# Patient Record
Sex: FEMALE | Race: WHITE | Employment: OTHER | URBAN - METROPOLITAN AREA
[De-identification: names, ages, dates, MRNs, and addresses within clinical notes are randomized per-mention and may not be internally consistent; named-entity substitution may affect disease eponyms.]

---

## 2018-04-03 ENCOUNTER — OFFICE VISIT (OUTPATIENT)
Dept: INTERNAL MEDICINE | Facility: CLINIC | Age: 73
End: 2018-04-03
Payer: COMMERCIAL

## 2018-04-03 VITALS
DIASTOLIC BLOOD PRESSURE: 60 MMHG | RESPIRATION RATE: 24 BRPM | SYSTOLIC BLOOD PRESSURE: 100 MMHG | TEMPERATURE: 97.6 F | BODY MASS INDEX: 13.59 KG/M2 | HEIGHT: 64 IN | OXYGEN SATURATION: 95 % | WEIGHT: 79.6 LBS | HEART RATE: 88 BPM

## 2018-04-03 DIAGNOSIS — Z78.0 ASYMPTOMATIC MENOPAUSE: ICD-10-CM

## 2018-04-03 DIAGNOSIS — Z12.11 SPECIAL SCREENING FOR MALIGNANT NEOPLASMS, COLON: ICD-10-CM

## 2018-04-03 DIAGNOSIS — Z13.1 SCREENING FOR DIABETES MELLITUS: ICD-10-CM

## 2018-04-03 DIAGNOSIS — Z12.2 ENCOUNTER FOR SCREENING FOR LUNG CANCER: ICD-10-CM

## 2018-04-03 DIAGNOSIS — Z13.220 SCREENING FOR CHOLESTEROL LEVEL: ICD-10-CM

## 2018-04-03 DIAGNOSIS — J44.9 CHRONIC OBSTRUCTIVE PULMONARY DISEASE, UNSPECIFIED COPD TYPE (H): ICD-10-CM

## 2018-04-03 DIAGNOSIS — Z11.59 NEED FOR HEPATITIS C SCREENING TEST: ICD-10-CM

## 2018-04-03 DIAGNOSIS — Z23 NEED FOR VACCINATION: ICD-10-CM

## 2018-04-03 DIAGNOSIS — Z76.89 ENCOUNTER TO ESTABLISH CARE: Primary | ICD-10-CM

## 2018-04-03 DIAGNOSIS — Z12.31 ENCOUNTER FOR SCREENING MAMMOGRAM FOR BREAST CANCER: ICD-10-CM

## 2018-04-03 DIAGNOSIS — R21 RASH: ICD-10-CM

## 2018-04-03 DIAGNOSIS — Z71.6 ENCOUNTER FOR SMOKING CESSATION COUNSELING: ICD-10-CM

## 2018-04-03 DIAGNOSIS — R63.6 UNDERWEIGHT: ICD-10-CM

## 2018-04-03 PROCEDURE — 99204 OFFICE O/P NEW MOD 45 MIN: CPT | Mod: 25 | Performed by: INTERNAL MEDICINE

## 2018-04-03 PROCEDURE — 90471 IMMUNIZATION ADMIN: CPT | Performed by: INTERNAL MEDICINE

## 2018-04-03 PROCEDURE — 90736 HZV VACCINE LIVE SUBQ: CPT | Performed by: INTERNAL MEDICINE

## 2018-04-03 RX ORDER — CLOBETASOL PROPIONATE 0.5 MG/ML
SOLUTION TOPICAL
Qty: 50 ML | Refills: 0 | Status: SHIPPED | OUTPATIENT
Start: 2018-04-03 | End: 2018-09-26

## 2018-04-03 RX ORDER — ALBUTEROL SULFATE 90 UG/1
2 AEROSOL, METERED RESPIRATORY (INHALATION) EVERY 6 HOURS PRN
Qty: 1 INHALER | Refills: 1 | Status: SHIPPED | OUTPATIENT
Start: 2018-04-03 | End: 2018-10-07

## 2018-04-03 RX ORDER — POLYETHYLENE GLYCOL 3350 17 G
2 POWDER IN PACKET (EA) ORAL
Qty: 360 LOZENGE | Refills: 3 | Status: SHIPPED | OUTPATIENT
Start: 2018-04-03 | End: 2018-09-26

## 2018-04-03 ASSESSMENT — PAIN SCALES - GENERAL: PAINLEVEL: NO PAIN (0)

## 2018-04-03 NOTE — PATIENT INSTRUCTIONS
Shingrix vaccine #1 today. #2 in 2-6 months.    ---    Please schedule CT chest, mammogram, bone scan, and fasting labs on the way out today.    ---    Please  stool kit in the lab downstairs.    ---    For scalp rash (suspect psoriasis) recommend clobetasol solution twice a day until resolution (though it may reoccur)    ---    Refill of albuterol sent to pharmacy.    ---    Please call to schedule a nutrition evaluation when able - number below.     ---    Nicotine patches daily + nicotine lozenges as needed.

## 2018-04-03 NOTE — MR AVS SNAPSHOT
After Visit Summary   4/3/2018    Zainab Colunga    MRN: [id number]           Patient Information     Date Of Birth          1945        Visit Information        Provider Department      4/3/2018 2:00 PM Yu Rao MD [facility]        Today's Diagnoses     Need for vaccination    -  1    H/O vulvar dysplasia        Underweight        Personal history of tobacco use, presenting hazards to health        Special screening for malignant neoplasms, colon        Asymptomatic menopause        Encounter for screening mammogram for breast cancer        Chronic obstructive pulmonary disease, unspecified COPD type (H)        Need for hepatitis C screening test        Rash        Screening for cholesterol level        Screening for diabetes mellitus        Encounter for smoking cessation counseling          Care Instructions    Shingrix vaccine #1 today. #2 in 2-6 months.    ---    Please schedule CT chest, mammogram, bone scan, and fasting labs on the way out today.    ---    Please  stool kit in the lab downstairs.    ---    For scalp rash (suspect psoriasis) recommend clobetasol solution twice a day until resolution (though it may reoccur)    ---    Refill of albuterol sent to pharmacy.    ---    Please call to schedule a nutrition evaluation when able - number below.     ---    Nicotine patches daily + nicotine lozenges as needed.             Follow-ups after your visit        Additional Services     NUTRITION REFERRAL       Your provider has referred you to: [facility]: [facility] [phone]   [email or web address]    Please be aware that coverage of these services is subject to the terms and limitations of your health insurance plan.  Call member services at your health plan with any benefit or coverage questions.      Please bring the following with you to your appointment:    (1) This referral  "request  (2) Any documents given to you regarding this referral  (3) Any specific questions you have about diet and/or food choices                  Future tests that were ordered for you today     Open Future Orders        Priority Expected Expires Ordered    Lipid Profile Routine  4/3/2019 4/3/2018    CBC with platelets Routine  4/3/2019 4/3/2018    Comprehensive metabolic panel Routine  4/3/2019 4/3/2018    Hepatitis C Screen Reflex to HCV RNA Quant and Genotype Routine  4/3/2019 4/3/2018    TSH with free T4 reflex Routine  4/3/2019 4/3/2018    DX Hip/Pelvis/Spine Routine  4/4/2019 4/3/2018    Fecal colorectal cancer screen (FIT) Routine 4/24/2018 6/26/2018 4/3/2018    CT Chest Lung Cancer Scrn Low Dose wo Routine  4/3/2019 4/3/2018    MA Screening Digital Bilateral Routine  4/4/2019 4/3/2018            Who to contact     If you have questions or need follow up information about today's clinic visit or your schedule please contact [facility] directly at [phone].  Normal or non-critical lab and imaging results will be communicated to you by BlueRoninhart, letter or phone within 4 business days after the clinic has received the results. If you do not hear from us within 7 days, please contact the clinic through CargoSpottert or phone. If you have a critical or abnormal lab result, we will notify you by phone as soon as possible.  Submit refill requests through Allihub or call your pharmacy and they will forward the refill request to us. Please allow 3 business days for your refill to be completed.          Additional Information About Your Visit        Allihub Information     Allihub lets you send messages to your doctor, view your test results, renew your prescriptions, schedule appointments and more. To sign up, go to [email or web address] . Click on \"Log in\" on the left side of the screen, which will take you to the Welcome page. Then click on \"Sign up Now\" on the right side of the page. " "    You will be asked to enter the access code listed below, as well as some personal information. Please follow the directions to create your username and password.     Your access code is: [email or web address]  Expires: 2018  2:54 PM     Your access code will  in 90 days. If you need help or a new code, please call your [place] clinic or [phone].        Care EveryWhere ID     This is your Care EveryWhere ID. This could be used by other organizations to access your [place] medical records  [id number]        Your Vitals Were     Pulse Temperature Respirations Height Last Period Pulse Oximetry    88 97.6  F (36.4  C) (Oral) 24 5' 3.8\" (1.621 m) (Approximate) 95%    Breastfeeding? BMI (Body Mass Index)                No 13.75 kg/m2           Blood Pressure from Last 3 Encounters:   18 100/60    Weight from Last 3 Encounters:   18 79 lb 9.6 oz (36.1 kg)              We Performed the Following     1st  Administration  [[id number]]     NUTRITION REFERRAL     SHINGRIX [[id number]]          Today's Medication Changes          These changes are accurate as of 4/3/18  2:58 PM.  If you have any questions, ask your nurse or doctor.               Start taking these medicines.        Dose/Directions    albuterol 108 (90 BASE) MCG/ACT Inhaler   Commonly known as:  PROAIR HFA/PROVENTIL HFA/VENTOLIN HFA   Used for:  Chronic obstructive pulmonary disease, unspecified COPD type (H)   Started by:  Yu Rao MD        Dose:  2 puff   Inhale 2 puffs into the lungs every 6 hours as needed for shortness of breath / dyspnea or wheezing   Quantity:  1 Inhaler   Refills:  1       clobetasol 0.05 % external solution   Commonly known as:  TEMOVATE   Used for:  Rash   Started by:  Yu Rao MD        Apply sparingly to affected area twice daily for 14 days.  Do not apply to face.   Quantity:  50 mL   Refills:  0       nicotine 7 MG/24HR 24 hr patch   Commonly known as:  NICODERM CQ   Used for:  Encounter for " smoking cessation counseling   Started by:  Yu Rao MD        Dose:  1 patch   Place 1 patch onto the skin every 24 hours   Quantity:  30 patch   Refills:  3       nicotine polacrilex 2 MG lozenge   Commonly known as:  COMMIT   Used for:  Encounter for smoking cessation counseling   Started by:  Yu Rao MD        Dose:  2 mg   Place 1 lozenge (2 mg) inside cheek every hour as needed for smoking cessation   Quantity:  360 lozenge   Refills:  3            Where to get your medicines      These medications were sent to [facility] [address] [place] [address] [id number] [place] [address]  [address] [facility] [address]     Phone:  [phone]     albuterol 108 (90 BASE) MCG/ACT Inhaler    clobetasol 0.05 % external solution    nicotine 7 MG/24HR 24 hr patch    nicotine polacrilex 2 MG lozenge                Primary Care Provider    None Specified       No primary provider on file.        Equal Access to Services     [facility]: Hadii saeid montenegroo Soclaire, waaxda luqadaha, qaybta kaalmada adepaulyada, rudolph zimmer . So [facility] [phone].    ATENCIÓN: Si habla español, tiene a amezquita disposición servicios gratuitos de asistencia lingüística. [facility] [phone].    We comply with applicable federal civil rights laws and [place] laws. We do not discriminate on the basis of race, color, national origin, age, disability, sex, sexual orientation, or gender identity.            Thank you!     Thank you for choosing [facility]  for your care. Our goal is always to provide you with excellent care. Hearing back from our patients is one way we can continue to improve our services. Please take a few minutes to complete the written survey that you may receive in the mail after your visit with us. Thank you!             Your Updated Medication List - Protect others around you: Learn how to safely use, store and throw away your medicines at  www.disposemymeds.org.          This list is accurate as of 4/3/18  2:58 PM.  Always use your most recent med list.                   Brand Name Dispense Instructions for use Diagnosis    albuterol 108 (90 BASE) MCG/ACT Inhaler    PROAIR HFA/PROVENTIL HFA/VENTOLIN HFA    1 Inhaler    Inhale 2 puffs into the lungs every 6 hours as needed for shortness of breath / dyspnea or wheezing    Chronic obstructive pulmonary disease, unspecified COPD type (H)       clobetasol 0.05 % external solution    TEMOVATE    50 mL    Apply sparingly to affected area twice daily for 14 days.  Do not apply to face.    Rash       nicotine 7 MG/24HR 24 hr patch    NICODERM CQ    30 patch    Place 1 patch onto the skin every 24 hours    Encounter for smoking cessation counseling       nicotine polacrilex 2 MG lozenge    COMMIT    360 lozenge    Place 1 lozenge (2 mg) inside cheek every hour as needed for smoking cessation    Encounter for smoking cessation counseling

## 2018-04-04 NOTE — PROGRESS NOTES
"  SUBJECTIVE:                                                      HPI: Zainab Colunga is a pleasant 73 year old female who presents to [facility] care:    Accompanied by grandson.    Complains of a rash:  - ongoing chronically - off and on for years; recurred/flared up several months ago  - involves posterior scalp and upper neck  - mildly itchy  - no rash elsewhere     Would like to stop smoking:  - has been smoking for ~60 years  - at most ~1.5 ppd  - currently ~3-5 cigarettes daily  - history of adverse reaction to Wellbutrin (hyperactivity)  - history of adverse reaction to nicotine replacement in the 1960s, but open to retrying now  - declines trial of Chantix    Past Medical History:   Diagnosis Date     COPD (chronic obstructive pulmonary disease) (H)      H/O vulvar dysplasia 1996    s/p bruno-vulvectomy     Underweight      Re: COPD:    - not on controller medications due to cost   - uses albuterol as needed - needs refills   - can only walk ~150 feet before having to rest due to shortness of breath    - requests completion of disability parking application based on this    Re: history of vulvar dysplasia:   - s/p hemipelvectomy in 1996    - pap smear 2015 negative/normal; no further screening indicated    Re: underweight:   - chronic - ongoing for years   - endorses poor appetite - chronic tobacco use likely contributing   - also endorses poor nutrition in general   - she is interested in meeting with a nutritionist to discuss gaining weight   - patient is overdue for multiple cancer screenings    Past Surgical History:   Procedure Laterality Date     CATARACT IOL, RT/LT Bilateral      GYN SURGERY  1960s    fallopian tube \"repair\"     VULVECTOMY SIMPLE  1996    bruno-vulvectomy     Family History   Problem Relation Age of Onset     CANCER Mother      unknown type     Type 2 Diabetes Brother      Coronary Artery Disease Brother      s/p PCI later in life     Lung Cancer Brother      smoker     Myocardial " "Infarction No family hx of      CEREBROVASCULAR DISEASE No family hx of      Coronary Artery Disease Early Onset No family hx of      Colon Cancer No family hx of      Ovarian Cancer No family hx of      Breast Cancer No family hx of      Occupational History     Retired - factory and cleaning jobs      Social History Main Topics     Smoking status: Current Every Day Smoker     Packs/day: 1.50     Years: 60.00     Types: Cigarettes     Smokeless tobacco: Never Used      Comment: 3-5 cigs/day     Alcohol use No     Drug use: No     Sexual activity: Not Currently     Social History Narrative    .    Lives in an apartment with  ( is passing away as of April, 2018).     Grandson and daughter help with cleaning, bills, cooking, groceries, and transportation.     1 adult daughter and 1 grandson.      Allergies   Allergen Reactions     Bupropion       hyperactivity     Ibuprofen      blurred vision with higher (800mg) dose; can tolerate 200mg dose     Penicillins Hives     MEDS: None    Immunization History   Administered Date(s) Administered     Pneumo Conj 13-V (2010&after) 11/22/2016     Pneumococcal 23 valent 11/01/2010     TDAP Vaccine (Adacel) 05/01/2015     Zoster vaccine recombinant adjuvanted (SHINGRIX) 04/03/2018     OBJECTIVE:                                                      /60 (BP Location: Left arm, Patient Position: Chair, Cuff Size: Adult Regular)  Pulse 88  Temp 97.6  F (36.4  C) (Oral)  Resp 24  Ht 5' 3.8\" (1.621 m)  Wt 79 lb 9.6 oz (36.1 kg)  LMP  (Approximate)  SpO2 95%  Breastfeeding? No  BMI 13.75 kg/m2  Constitutional: frail-appearing and underweight  Psych: normal judgment and insight; normal mood and affect; recent and remote memory intact; oriented to time, place, and person  Integumentary: multiple erythematous patches, with thick overlying, silver white scale involving occipital scalp and upper neck    PREVENTATIVE HEALTH                                    "                   Blood pressure: within normal limits   Breast CA screening: DUE  Cervical CA screening: screening no longer indicated  Colon CA screening: DUE  Lung CA screening: DUE  Dexa: DUE  Screening HCV: DUE  Screening cholesterol: DUE  Screening diabetes: DUE  STD testing: no risk factors present  Depression screening: PHQ-2 assessment completed and reviewed - no intervention indicated at this time  Alcohol misuse screening: alcohol use reviewed - no intervention indicated at this time  Immunizations: reviewed; Shingrix series recommended    ASSESSMENT/PLAN:                                                       (Z76.89) Encounter to establish care  (primary encounter diagnosis)  Comment: PMH, PSH, FH, SH, medications, allergies, immunizations, and preventative health measures reviewed.   Plan: see below for plans.    (Z23) Need for vaccination  Plan: Shingrix #1 today; #2 in 2-6 months.    (Z78.0) Asymptomatic menopause  Plan: DEXA ordered - patient to schedule.    (Z12.11) Special screening for malignant neoplasms, colon  Comment: Patient declines screening colonoscopy.  Plan: FIT test ordered - patient to  kit, complete, and mail in when able.     (Z12.31) Encounter for screening mammogram for breast cancer  Plan: mammogram ordered - patient to schedule.    (Z12.2) Encounter for screening for lung cancer  Plan: CT chest lung cancer screening ordered - patient to schedule.    (Z13.220) Screening for cholesterol level  (Z13.1) Screening for diabetes mellitus  (Z11.59) Need for hepatitis C screening test  Plan: Patient will return for fasting labs when able.    (R21) Rash  Comment: involving the occipital scalp and upper neck; suspect psoriasis.  Plan: clobetasol 0.05% solution bid until resolution.       (Z71.6,  Z72.0) Encounter for smoking cessation counseling  Comment: see discussion above.  Plan:    - nicotine patch (7 mg) daily.   - nicotine lozenges to be used as needed for acute  cravings.    (R63.6) Underweight  Comment:    - etiology unclear.    - chronic tobacco use likely contributing.   - need to evaluate for other potential causes including malignancy (patient is due for multiple screenings).  Plan:    - CBC, CMP, TSH reflex with above labs.   - cancer screenings as above.   - patient encouraged to stop smoking.    - referred to nutrition for further evaluation, education, and meal planning - patient to schedule.    (J44.9) Chronic obstructive pulmonary disease, unspecified COPD type (H)  Comment: cannot walk more than 150 feet without stopping to catch her breath.  Plan:    - refill of albuterol provided.   - disability parking placard application completed today.    The instructions on the AVS were discussed and explained to the patient. Patient expressed understanding of instructions.    A total of 45 minutes were spent face-to-face with this patient during this encounter and over half of that time was spent on counseling and coordination of care re: above diagnoses and plans of care.     (Chart documentation was completed, in part, with Sof[employer]ie voice-recognition software. Even though reviewed, some grammatical, spelling, and word errors may remain.)    Yu Rao MD   [address] [address]  T: [phone], F: [phone]

## 2018-04-06 ENCOUNTER — TELEPHONE (OUTPATIENT)
Dept: INTERNAL MEDICINE | Facility: CLINIC | Age: 73
End: 2018-04-06

## 2018-04-06 NOTE — TELEPHONE ENCOUNTER
Nutrition Education Scheduling Outreach #1:    Call to patient to schedule. Patient declined to schedule.        Dana Churchill  [facility]  Diabetes and Nutrition Scheduling

## 2018-04-20 DIAGNOSIS — Z71.6 ENCOUNTER FOR TOBACCO USE CESSATION COUNSELING: Primary | ICD-10-CM

## 2018-04-20 NOTE — TELEPHONE ENCOUNTER
Pharmacy called clinic today  Nicotine patches are not covered under her insurance  Pharmacy asking to resend Rx for Nicotrol inhaler instead

## 2018-04-20 NOTE — TELEPHONE ENCOUNTER
Nicotrol Inhaler       Last Written Prescription Date:  NA  Last Fill Quantity: Na,   # refills: NA  Last Office Visit: 4/3/18  Future Office visit:       Routing refill request to provider for review/approval because:  Drug not active on patient's medication list

## 2018-04-28 DIAGNOSIS — Z12.11 SPECIAL SCREENING FOR MALIGNANT NEOPLASMS, COLON: ICD-10-CM

## 2018-04-28 PROCEDURE — 82274 ASSAY TEST FOR BLOOD FECAL: CPT | Performed by: INTERNAL MEDICINE

## 2018-04-29 LAB — HEMOCCULT STL QL IA: NEGATIVE

## 2018-05-02 ENCOUNTER — RADIANT APPOINTMENT (OUTPATIENT)
Dept: BONE DENSITY | Facility: CLINIC | Age: 73
End: 2018-05-02
Attending: INTERNAL MEDICINE
Payer: COMMERCIAL

## 2018-05-02 ENCOUNTER — RADIANT APPOINTMENT (OUTPATIENT)
Dept: MAMMOGRAPHY | Facility: CLINIC | Age: 73
End: 2018-05-02
Attending: INTERNAL MEDICINE
Payer: COMMERCIAL

## 2018-05-02 DIAGNOSIS — Z12.31 VISIT FOR SCREENING MAMMOGRAM: ICD-10-CM

## 2018-05-02 DIAGNOSIS — Z12.31 ENCOUNTER FOR SCREENING MAMMOGRAM FOR BREAST CANCER: ICD-10-CM

## 2018-05-02 DIAGNOSIS — Z78.0 ASYMPTOMATIC MENOPAUSE: ICD-10-CM

## 2018-05-02 PROCEDURE — 77085 DXA BONE DENSITY AXL VRT FX: CPT | Performed by: INTERNAL MEDICINE

## 2018-05-02 PROCEDURE — 77067 SCR MAMMO BI INCL CAD: CPT | Mod: TC

## 2018-05-03 DIAGNOSIS — R63.6 UNDERWEIGHT: ICD-10-CM

## 2018-05-03 DIAGNOSIS — Z13.220 SCREENING FOR CHOLESTEROL LEVEL: ICD-10-CM

## 2018-05-03 DIAGNOSIS — Z11.59 NEED FOR HEPATITIS C SCREENING TEST: ICD-10-CM

## 2018-05-03 LAB
ALBUMIN SERPL-MCNC: 3.8 G/DL (ref 3.4–5)
ALP SERPL-CCNC: 77 U/L (ref 40–150)
ALT SERPL W P-5'-P-CCNC: 20 U/L (ref 0–50)
ANION GAP SERPL CALCULATED.3IONS-SCNC: 7 MMOL/L (ref 3–14)
AST SERPL W P-5'-P-CCNC: 16 U/L (ref 0–45)
BILIRUB SERPL-MCNC: 0.5 MG/DL (ref 0.2–1.3)
BUN SERPL-MCNC: 18 MG/DL (ref 7–30)
CALCIUM SERPL-MCNC: 9.3 MG/DL (ref 8.5–10.1)
CHLORIDE SERPL-SCNC: 108 MMOL/L (ref 94–109)
CHOLEST SERPL-MCNC: 219 MG/DL
CO2 SERPL-SCNC: 27 MMOL/L (ref 20–32)
CREAT SERPL-MCNC: 0.72 MG/DL (ref 0.52–1.04)
ERYTHROCYTE [DISTWIDTH] IN BLOOD BY AUTOMATED COUNT: 12.6 % (ref 10–15)
GFR SERPL CREATININE-BSD FRML MDRD: 80 ML/MIN/1.7M2
GLUCOSE SERPL-MCNC: 91 MG/DL (ref 70–99)
HCT VFR BLD AUTO: 42.2 % (ref 35–47)
HCV AB SERPL QL IA: NONREACTIVE
HDLC SERPL-MCNC: 101 MG/DL
HGB BLD-MCNC: 13.8 G/DL (ref 11.7–15.7)
LDLC SERPL CALC-MCNC: 104 MG/DL
MCH RBC QN AUTO: 32.9 PG (ref 26.5–33)
MCHC RBC AUTO-ENTMCNC: 32.7 G/DL (ref 31.5–36.5)
MCV RBC AUTO: 101 FL (ref 78–100)
NONHDLC SERPL-MCNC: 118 MG/DL
PLATELET # BLD AUTO: 195 10E9/L (ref 150–450)
POTASSIUM SERPL-SCNC: 4.4 MMOL/L (ref 3.4–5.3)
PROT SERPL-MCNC: 7 G/DL (ref 6.8–8.8)
RBC # BLD AUTO: 4.2 10E12/L (ref 3.8–5.2)
SODIUM SERPL-SCNC: 142 MMOL/L (ref 133–144)
TRIGL SERPL-MCNC: 69 MG/DL
TSH SERPL DL<=0.005 MIU/L-ACNC: 2.95 MU/L (ref 0.4–4)
WBC # BLD AUTO: 7.2 10E9/L (ref 4–11)

## 2018-05-03 PROCEDURE — 80061 LIPID PANEL: CPT | Performed by: INTERNAL MEDICINE

## 2018-05-03 PROCEDURE — 84443 ASSAY THYROID STIM HORMONE: CPT | Performed by: INTERNAL MEDICINE

## 2018-05-03 PROCEDURE — 36415 COLL VENOUS BLD VENIPUNCTURE: CPT | Performed by: INTERNAL MEDICINE

## 2018-05-03 PROCEDURE — 85027 COMPLETE CBC AUTOMATED: CPT | Performed by: INTERNAL MEDICINE

## 2018-05-03 PROCEDURE — 80053 COMPREHEN METABOLIC PANEL: CPT | Performed by: INTERNAL MEDICINE

## 2018-05-03 PROCEDURE — 86803 HEPATITIS C AB TEST: CPT | Performed by: INTERNAL MEDICINE

## 2018-05-08 DIAGNOSIS — M81.0 AGE-RELATED OSTEOPOROSIS WITHOUT CURRENT PATHOLOGICAL FRACTURE: Primary | ICD-10-CM

## 2018-05-08 NOTE — TELEPHONE ENCOUNTER
Reason for call:  Medication   If this is a refill request, has the caller requested the refill from the pharmacy already? No  Will the patient be using a [facility]? No  Name of the pharmacy and phone number for the current request: [facility] mail order pharmacy    Name of the medication requested: patient needs a rx generic fosamax, vit D and calcium sent to [facility] [phone]. They cover these medications for free    Other request: please call in RX    Phone number to reach patient:  Home number on file [phone] (home)    Best Time:  anytime    Can we leave a detailed message on this number?  YES

## 2018-05-09 RX ORDER — ALENDRONATE SODIUM 70 MG/1
TABLET ORAL
Qty: 4 TABLET | Refills: 1 | Status: SHIPPED | OUTPATIENT
Start: 2018-05-09 | End: 2018-09-26

## 2018-05-14 ENCOUNTER — HOSPITAL ENCOUNTER (OUTPATIENT)
Dept: CT IMAGING | Facility: CLINIC | Age: 73
Discharge: HOME | End: 2018-05-14
Attending: INTERNAL MEDICINE | Admitting: INTERNAL MEDICINE
Payer: COMMERCIAL

## 2018-05-14 DIAGNOSIS — Z12.2 ENCOUNTER FOR SCREENING FOR LUNG CANCER: ICD-10-CM

## 2018-05-14 PROCEDURE — G0297 LDCT FOR LUNG CA SCREEN: HCPCS

## 2018-05-25 ENCOUNTER — TELEPHONE (OUTPATIENT)
Dept: INTERNAL MEDICINE | Facility: CLINIC | Age: 73
End: 2018-05-25

## 2018-09-26 ENCOUNTER — OFFICE VISIT (OUTPATIENT)
Dept: INTERNAL MEDICINE | Facility: CLINIC | Age: 73
End: 2018-09-26
Payer: COMMERCIAL

## 2018-09-26 VITALS
WEIGHT: 85.6 LBS | HEART RATE: 95 BPM | OXYGEN SATURATION: 97 % | DIASTOLIC BLOOD PRESSURE: 72 MMHG | SYSTOLIC BLOOD PRESSURE: 110 MMHG | RESPIRATION RATE: 20 BRPM | BODY MASS INDEX: 14.79 KG/M2 | TEMPERATURE: 97.9 F

## 2018-09-26 DIAGNOSIS — R63.6 UNDERWEIGHT: ICD-10-CM

## 2018-09-26 DIAGNOSIS — Z23 NEED FOR PROPHYLACTIC VACCINATION AND INOCULATION AGAINST INFLUENZA: ICD-10-CM

## 2018-09-26 DIAGNOSIS — J44.9 SEVERE CHRONIC OBSTRUCTIVE PULMONARY DISEASE (H): Primary | ICD-10-CM

## 2018-09-26 PROCEDURE — G0008 ADMIN INFLUENZA VIRUS VAC: HCPCS | Performed by: INTERNAL MEDICINE

## 2018-09-26 PROCEDURE — 99213 OFFICE O/P EST LOW 20 MIN: CPT | Mod: 25 | Performed by: INTERNAL MEDICINE

## 2018-09-26 PROCEDURE — 90662 IIV NO PRSV INCREASED AG IM: CPT | Performed by: INTERNAL MEDICINE

## 2018-09-26 NOTE — PATIENT INSTRUCTIONS
Advair 2 puffs TWICE a day - please use consistently for best results.    Follow-up in 4-6 weeks.     ---    Flu shot today.

## 2018-09-26 NOTE — PROGRESS NOTES
SUBJECTIVE:                                                      HPI: Zainab Colunga is a pleasant 73 year old female who presents for COPD follow-up:    Patient has a history of severe COPD. She has historically not been able to afford controller medications and has been using her albuterol inhaler as needed for shortness of breath. She is only able to walk 10 feet before feeling so short of breath she has to rest.    No wheezing or coughing.    She is underweight likely due to the increased metabolic demands of severe COPD.    Flu shot DUE.    The medication, allergy, and problem lists have been reviewed and updated as appropriate.       OBJECTIVE:                                                      /72  Pulse 95  Temp 97.9  F (36.6  C) (Oral)  Resp 20  Wt 85 lb 9.6 oz (38.8 kg)  SpO2 97%  BMI 14.79 kg/m2  Constitutional: frail appearing  Respiratory: normal respiratory effort; clear to auscultation bilaterally - no wheezing       ASSESSMENT/PLAN:                                                      (J44.9) Severe chronic obstructive pulmonary disease (H)  (primary encounter diagnosis)  (R63.6) Underweight  Comment: patient really needs to be on a controller medication.  Plan:    - prescription for Advair provided.   - pharmacy instructed to substitute as needed for affordability    - pharmacy instructed to seek coupons or financial assistance for patient where able.     - follow-up in 4-6 weeks.   - patient would also likely benefit from pulmonary rehab - will discuss at next visit.     (Z23) Need for prophylactic vaccination and inoculation against influenza  Plan: high-dose flu shot given today.    The instructions on the AVS were discussed and explained to the patient. Patient expressed understanding of instructions.    (Chart documentation was completed, in part, with [employer] voice-recognition software. Even though reviewed, some grammatical, spelling, and word errors may remain.)    Yu  MD Jalen   [address] [address]  T: [phone], F: [phone]

## 2018-10-06 ENCOUNTER — APPOINTMENT (OUTPATIENT)
Dept: GENERAL RADIOLOGY | Facility: CLINIC | Age: 73
End: 2018-10-06
Attending: EMERGENCY MEDICINE
Payer: COMMERCIAL

## 2018-10-06 ENCOUNTER — HOSPITAL ENCOUNTER (EMERGENCY)
Facility: CLINIC | Age: 73
Discharge: HOME | End: 2018-10-06
Attending: EMERGENCY MEDICINE | Admitting: EMERGENCY MEDICINE
Payer: COMMERCIAL

## 2018-10-06 VITALS
RESPIRATION RATE: 20 BRPM | TEMPERATURE: 97.5 F | OXYGEN SATURATION: 97 % | DIASTOLIC BLOOD PRESSURE: 73 MMHG | SYSTOLIC BLOOD PRESSURE: 145 MMHG | HEART RATE: 101 BPM

## 2018-10-06 DIAGNOSIS — J44.1 COPD EXACERBATION (H): ICD-10-CM

## 2018-10-06 DIAGNOSIS — J20.9 ACUTE BRONCHITIS, UNSPECIFIED ORGANISM: ICD-10-CM

## 2018-10-06 PROCEDURE — 94640 AIRWAY INHALATION TREATMENT: CPT

## 2018-10-06 PROCEDURE — 25000125 ZZHC RX 250: Performed by: EMERGENCY MEDICINE

## 2018-10-06 PROCEDURE — 25000132 ZZH RX MED GY IP 250 OP 250 PS 637: Performed by: EMERGENCY MEDICINE

## 2018-10-06 PROCEDURE — 99284 EMERGENCY DEPT VISIT MOD MDM: CPT | Mod: 25

## 2018-10-06 PROCEDURE — 94664 DEMO&/EVAL PT USE INHALER: CPT

## 2018-10-06 PROCEDURE — 40000275 ZZH STATISTIC RCP TIME EA 10 MIN

## 2018-10-06 PROCEDURE — 71046 X-RAY EXAM CHEST 2 VIEWS: CPT

## 2018-10-06 RX ORDER — IPRATROPIUM BROMIDE AND ALBUTEROL SULFATE 2.5; .5 MG/3ML; MG/3ML
3 SOLUTION RESPIRATORY (INHALATION) ONCE
Status: COMPLETED | OUTPATIENT
Start: 2018-10-06 | End: 2018-10-06

## 2018-10-06 RX ORDER — PREDNISONE 20 MG/1
40 TABLET ORAL DAILY
Qty: 8 TABLET | Refills: 0 | Status: SHIPPED | OUTPATIENT
Start: 2018-10-06 | End: 2018-10-10

## 2018-10-06 RX ORDER — PREDNISONE 20 MG/1
40 TABLET ORAL ONCE
Status: COMPLETED | OUTPATIENT
Start: 2018-10-06 | End: 2018-10-06

## 2018-10-06 RX ORDER — DOXYCYCLINE 100 MG/1
100 CAPSULE ORAL ONCE
Status: COMPLETED | OUTPATIENT
Start: 2018-10-06 | End: 2018-10-06

## 2018-10-06 RX ORDER — CALCIUM CARBONATE 500(1250)
1 TABLET ORAL 2 TIMES DAILY
COMMUNITY

## 2018-10-06 RX ORDER — DOXYCYCLINE 100 MG/1
100 CAPSULE ORAL 2 TIMES DAILY
Qty: 20 CAPSULE | Refills: 0 | Status: SHIPPED | OUTPATIENT
Start: 2018-10-06 | End: 2018-10-16

## 2018-10-06 RX ADMIN — IPRATROPIUM BROMIDE AND ALBUTEROL SULFATE 3 ML: 2.5; .5 SOLUTION RESPIRATORY (INHALATION) at 20:39

## 2018-10-06 RX ADMIN — PREDNISONE 40 MG: 20 TABLET ORAL at 19:49

## 2018-10-06 RX ADMIN — IPRATROPIUM BROMIDE AND ALBUTEROL SULFATE 3 ML: 2.5; .5 SOLUTION RESPIRATORY (INHALATION) at 19:51

## 2018-10-06 RX ADMIN — DOXYCYCLINE HYCLATE 100 MG: 100 CAPSULE, GELATIN COATED ORAL at 21:37

## 2018-10-06 ASSESSMENT — ENCOUNTER SYMPTOMS
WHEEZING: 1
SHORTNESS OF BREATH: 1
FEVER: 0

## 2018-10-06 NOTE — ED AVS SNAPSHOT
Emergency Department    [address] [facility] [address]    Phone:  [phone]    Fax:  [phone]                                       Zainab Colunga   MRN: [id number]    Department:   Emergency Department   Date of Visit:  10/6/2018           After Visit Summary Signature Page     I have received my discharge instructions, and my questions have been answered. I have discussed any challenges I see with this plan with the nurse or doctor.    ..........................................................................................................................................  Patient/Patient Representative Signature      ..........................................................................................................................................  Patient Representative Print Name and Relationship to Patient    ..................................................               ................................................  Date                                   Time    ..........................................................................................................................................  Reviewed by Signature/Title    ...................................................              ..............................................  Date                                               Time          22EPIC Rev 08/18

## 2018-10-06 NOTE — ED AVS SNAPSHOT
Emergency Department    [address] [facility] [address]    Phone:  [phone]    Fax:  [phone]                                       Zainab Colunga   MRN: [id number]    Department:   Emergency Department   Date of Visit:  10/6/2018           Patient Information     Date Of Birth          1945        Your diagnoses for this visit were:     COPD exacerbation (H)     Acute bronchitis, unspecified organism        You were seen by Isabella Aguilar MD.      Follow-up Information     Schedule an appointment as soon as possible for a visit with Yu Rao MD.    Specialty:  Internal Medicine    Contact information:    [address] [facility] [address] [phone]          Discharge Instructions       Use your inhaler 2 puffs every 4 hours while awake with the AeroChamber and every hour as needed.  Prednisone for 4 more days, doxycycline for 10 days.  Recheck in the clinic this week with your doctor, it may be a good idea for you to consult again with the pulmonary doctor.  If you get acutely worse, return to the ER.        Discharge References/Attachments     COPD FLARE (ENGLISH)    BRONCHITIS, ANTIBIOTIC TREATMENT (ADULT) (ENGLISH)      24 Hour Appointment Hotline       To make an appointment at any [facility] [place], call [phone] ([phone]). If you don't have a family doctor or clinic, we will help you find one. [facility] are conveniently located to serve the needs of you and your family.             Review of your medicines      START taking        Dose / Directions Last dose taken    doxycycline 100 MG capsule   Commonly known as:  VIBRAMYCIN   Dose:  100 mg   Quantity:  20 capsule        Take 1 capsule (100 mg) by mouth 2 times daily for 10 days   Refills:  0        predniSONE 20 MG tablet   Commonly known as:  DELTASONE   Dose:  40 mg   Quantity:  8 tablet        Take 2 tablets (40 mg) by mouth daily for 4 days   Refills:  0          Our records show  that you are taking the medicines listed below. If these are incorrect, please call your family doctor or clinic.        Dose / Directions Last dose taken    albuterol 108 (90 Base) MCG/ACT inhaler   Commonly known as:  PROAIR HFA/PROVENTIL HFA/VENTOLIN HFA   Dose:  2 puff   Quantity:  1 Inhaler        Inhale 2 puffs into the lungs every 6 hours as needed for shortness of breath / dyspnea or wheezing   Refills:  1        calcium carbonate 500 mg (elemental) 500 MG tablet   Commonly known as:  OS-GILMAR   Dose:  1 tablet        Take 1 tablet by mouth 2 times daily   Refills:  0        fluticasone-salmeterol 250-50 MCG/DOSE diskus inhaler   Commonly known as:  ADVAIR   Dose:  1 puff   Quantity:  3 Inhaler        Inhale 1 puff into the lungs 2 times daily   Refills:  3                Prescriptions were sent or printed at these locations (2 Prescriptions)                   Other Prescriptions                Printed at Department/Unit printer (2 of 2)         doxycycline (VIBRAMYCIN) 100 MG capsule               predniSONE (DELTASONE) 20 MG tablet                Procedures and tests performed during your visit     Chest XR,  PA & LAT      Orders Needing Specimen Collection     None      Pending Results     No orders found from 10/4/2018 to 10/7/2018.            Pending Culture Results     No orders found from 10/4/2018 to 10/7/2018.            Pending Results Instructions     If you had any lab results that were not finalized at the time of your Discharge, you can call the ED Lab Result RN at [phone]. You will be contacted by this team for any positive Lab results or changes in treatment. The nurses are available 7 days a week from 10A to 6:30P.  You can leave a message 24 hours per day and they will return your call.        Test Results From Your Hospital Stay        10/6/2018  8:27 PM      Narrative     CHEST TWO VIEWS  10/6/2018 8:08 PM     HISTORY: COPD, worsening shortness of breath with green  phlegm.    COMPARISON: 7/23/2005        Impression     IMPRESSION: Hyperinflated lung zones consistent with chronic  obstructive pulmonary disease are noted. Heart size appears small due  to the hyperinflated lungs. Pulmonary vasculature is normal. No  infiltrates or effusions.    ZACARIAS IRENE MD                Clinical Quality Measure: Blood Pressure Screening     Your blood pressure was checked while you were in the emergency department today. The last reading we obtained was  BP: 145/73 . Please read the guidelines below about what these numbers mean and what you should do about them.  If your systolic blood pressure (the top number) is less than 120 and your diastolic blood pressure (the bottom number) is less than 80, then your blood pressure is normal. There is nothing more that you need to do about it.  If your systolic blood pressure (the top number) is 120-139 or your diastolic blood pressure (the bottom number) is 80-89, your blood pressure may be higher than it should be. You should have your blood pressure rechecked within a year by a primary care provider.  If your systolic blood pressure (the top number) is 140 or greater or your diastolic blood pressure (the bottom number) is 90 or greater, you may have high blood pressure. High blood pressure is treatable, but if left untreated over time it can put you at risk for heart attack, stroke, or kidney failure. You should have your blood pressure rechecked by a primary care provider within the next 4 weeks.  If your provider in the emergency department today gave you specific instructions to follow-up with your doctor or provider even sooner than that, you should follow that instruction and not wait for up to 4 weeks for your follow-up visit.        Thank you for choosing [facility]       Thank you for choosing [facility] for your care. Our goal is always to provide you with excellent care. Hearing back from our patients is one way we can continue to improve  our services. Please take a few minutes to complete the written survey that you may receive in the mail after you visit with us. Thank you!        Care EveryWhere ID     This is your Care EveryWhere ID. This could be used by other organizations to access your [facility] medical records  [id number]        Equal Access to Services     JUAN FRANCISCO PEREZ : Antoinette Santos, shazia bunch, starla fitzgeraldaltrish lyles, rudolph persaud. So [facility] [phone].    ATENCIÓN: Si habla español, tiene a amezquita disposición servicios gratuitos de asistencia lingüística. Llame al [phone].    We comply with applicable federal civil rights laws and [place] laws. We do not discriminate on the basis of race, color, national origin, age, disability, sex, sexual orientation, or gender identity.            After Visit Summary       This is your record. Keep this with you and show to your community pharmacist(s) and doctor(s) at your next visit.

## 2018-10-07 DIAGNOSIS — J44.9 CHRONIC OBSTRUCTIVE PULMONARY DISEASE, UNSPECIFIED COPD TYPE (H): ICD-10-CM

## 2018-10-07 NOTE — TELEPHONE ENCOUNTER
"Requested Prescriptions   Pending Prescriptions Disp Refills     VENTOLIN  (90 Base) MCG/ACT inhaler [Pharmacy Med Name: VENTOLIN HFA        AER]  1    Last Written Prescription Date:  4/3/2018  Last Fill Quantity: 1,  # refills: 1   Last office visit: 9/26/2018 with prescribing provider:  9/26/2018   Future Office Visit:     Sig: INHALE 2 PUFFS BY MOUTH EVERY 6 HOURS AS NEEDED FOR SHORTNESS OF BREATH/DYSPNEA OR WHEEZING    Asthma Maintenance Inhalers - Anticholinergics Passed    10/7/2018  1:00 PM         Passed - Patient is age 12 years or older       Passed - Recent (12 mo) or future (30 days) visit within the authorizing provider's specialty    Patient had office visit in the last 12 months or has a visit in the next 30 days with authorizing provider or within the authorizing provider's specialty.  See \"Patient Info\" tab in inbasket, or \"Choose Columns\" in Meds & Orders section of the refill encounter.              "

## 2018-10-07 NOTE — DISCHARGE INSTRUCTIONS
Use your inhaler 2 puffs every 4 hours while awake with the AeroChamber and every hour as needed.  Prednisone for 4 more days, doxycycline for 10 days.  Recheck in the clinic this week with your doctor, it may be a good idea for you to consult again with the pulmonary doctor.  If you get acutely worse, return to the ER.

## 2018-10-07 NOTE — PROGRESS NOTES
Pt given neb via mouthpiece, bs diminished, room air.  States she becomes sob with exertion.  Showed pt good technique with spacer and her MDI.  Rogelio Cisneros

## 2018-10-07 NOTE — ED PROVIDER NOTES
History     Chief Complaint:  Shortness of Breath    HPI   Zainab Colunga is a 73 year old female who presents with chronic shortness of breath with some productive green phlegm. The patient reports that her COPD and shortness of breath have gotten worse in the last month. At 0200 this morning, the patient had an acute incident where she felt she couldn't breathe. As she relaxed her symptoms improved. The patient came to the E.R today due to fear of the shortness of breath repeating tonight. The patient states that she took 5 to 6 puffs from her inhaler today and that she does not use a nebulizer. The patient denies any fever or new phlegm or being diabetic. The patient has not been on antibiotics or steroids in a long time and believes her last chest x-ray was in April.     Allergies:  Bupropion  Ibuprofen  Penicillin     Medications:    left eye-GILMAR  Albuterol  Advair    Past Medical History:    H/O vulvar dysplasia  Severe chronic obstructive pulmonary disease  Underweight  Severe COPD    Past Surgical History:    Cataract IOL  GYN Surgery Fallopian tube repair  Vulvectomy    Family History:    Cancer  Type 2 Diabetes  Coronary Artery Disease  Lung Cancer    Social History:  Patient is a former smoker (1.50 packs/day for 60 years) who quit in 2018. The patient does not use alcohol.   Marital Status:   [2]     Review of Systems   Constitutional: Negative for fever.   Respiratory: Positive for shortness of breath and wheezing.    All other systems reviewed and are negative.    Physical Exam     Patient Vitals for the past 24 hrs:   BP Temp Temp src Pulse Resp SpO2   10/06/18 2119 - - - 101 20 -   10/06/18 1954 - - - 90 20 97 %   10/06/18 1919 145/73 97.5  F (36.4  C) Oral 109 22 93 %     Physical Exam  Nursing note and vitals reviewed.    Constitutional:  Appears well-developed and well-nourished, patient appears to be working hard    with her breathing. Very thin. Cachexic.   HENT:     Nose normal.  No  discharge.      Oropharynx is clear and moist.  Lymph:  No enlarged or tender cervical or submandibular lymph nodes.   Cardiovascular:  Normal rate, regular rhythm with normal S1 and S2.      Normal heart sounds and peripheral pulses 2+ and equal.       No murmur or tulio.  Pulmonary:  No stridor. Increased respiratory effort with prolonged expiratory phase.      No wheezes. No rales.     GI:    Soft. No distension and no mass. No tenderness.      No rebound and no guarding. No flank pain.  No HSM.  Musculoskeletal:  Normal range of motion. No extremity deformity.     No edema and no tenderness.  No cyanosis.  Neurological:   Alert and oriented. No cranial nerve deficit, no facial droop.     Exhibits good muscle tone. Coordination normal.      GCS eye subscore is 4. GCS verbal subscore is 5.      GCS motor subscore is 6.   Skin:    Skin is warm and dry. No rash noted. No diaphoresis.      No erythema. No pallor.  No lesions.  Psychiatric:   Behavior is normal. Appropriate mood and affect.     Judgment and thought content normal.     Emergency Department Course   Imaging:  Radiographic findings were communicated with the patient who voiced understanding of the findings.  Chest XR, PA & LAT  IMPRESSION: Hyperinflated lung zones consistent with chronic  obstructive pulmonary disease are noted. Heart size appears small due  to the hyperinflated lungs. Pulmonary vasculature is normal. No  infiltrates or effusions.  As read by Radiology.    Interventions:  1949: Deltasone 40 mg PO  1951, 2039: DUONEB 3 mL [facility]    Emergency Department Course:  Past medical records, nursing notes, and vitals reviewed.  1931: I performed an exam of the patient and obtained history, as documented above.  2027: The patient was sent for a Chest XR, PA & LAT while in the emergency department, findings above.  2037: I rechecked the patient. Explained findings to patient.  2147: I rechecked the patient. Findings and plan explained to the Patient.  Patient discharged home with instructions regarding supportive care, medications, and reasons to return. The importance of close follow-up was reviewed.     Impression & Plan    Medical Decision Making:  Patient comes in with a COPD exacerbation.  She is not on steroids, was trying to use her inhaler without a spacer and is not on antibiotics this time despite thick green purulent sputum.  No fevers.  Her lungs sounded fairly clear and chest x-ray just showed COPD but no evidence of infiltration.  She was given a DuoNeb and her breathing improved dramatically.  She was given a second DuoNeb and she felt even better yet.  Prednisone was given orally 40 mg and doxycycline 100 mg orally.  I instructed in the new use of a spacer tube with her inhaler and I will put her on 4 more days of prednisone and 10 days of doxycycline with follow-up in the clinic this week.  She would probably benefit with her worsening COPD of seeing a pulmonary specialist.  Would have her follow-up with her primary doctor this week for further management and referral if indicated.    Diagnosis:    ICD-10-CM    1. COPD exacerbation (H) J44.1    2. Acute bronchitis, unspecified organism J20.9        Disposition:  discharged to home. Use your inhaler 2 puffs every 4 hours while awake with the AeroChamber and every hour as needed.  Prednisone for 4 more days, doxycycline for 10 days.  Recheck in the clinic this week with your doctor, it may be a good idea for you to consult again with the pulmonary doctor.  If you get acutely worse, return to the ER.    Discharge Medications:  Discharge Medication List as of 10/6/2018  9:19 PM      START taking these medications    Details   doxycycline (VIBRAMYCIN) 100 MG capsule Take 1 capsule (100 mg) by mouth 2 times daily for 10 days, Disp-20 capsule, R-0, Local Print      predniSONE (DELTASONE) 20 MG tablet Take 2 tablets (40 mg) by mouth daily for 4 days, Disp-8 tablet, R-0, Local Print           Aryan  Karel  10/6/2018    EMERGENCY DEPARTMENT  I, Aryan Vinson, am serving as a scribe at 7:31 PM on 10/6/2018 to document services personally performed by Isabella Aguilar MD based on my observations and the provider's statements to me.       Isabella Aguilar MD  10/06/18 3901

## 2018-10-08 NOTE — TELEPHONE ENCOUNTER
Spoke with patient and asked that she call insurance company to find out what medication is covered.  Patient understood.  She will call back with further information.

## 2018-10-08 NOTE — TELEPHONE ENCOUNTER
Patient state her inhaler was over $200 and she can not afford that. She would like something cheaper sent to her pharmacy.

## 2018-10-10 RX ORDER — ALBUTEROL SULFATE 90 UG/1
AEROSOL, METERED RESPIRATORY (INHALATION)
Qty: 18 G | Refills: 3 | Status: SHIPPED | OUTPATIENT
Start: 2018-10-10

## 2018-10-11 ENCOUNTER — TELEPHONE (OUTPATIENT)
Dept: INTERNAL MEDICINE | Facility: CLINIC | Age: 73
End: 2018-10-11

## 2018-10-11 DIAGNOSIS — J44.1 COPD EXACERBATION (H): Primary | ICD-10-CM

## 2018-10-11 NOTE — TELEPHONE ENCOUNTER
Reason for Call:  Medication or medication refill:    Do you use a [facility]?  Name of the pharmacy and phone number for the current request:  [facility] in     Name of the medication requested: budesonide    Other request: Pt was in the hospital  A doctor at the hospital wanted the pt to use a nebulizer which her son is getting for her.  She needs a script for the budesonide.    Can we leave a detailed message on this number? YES    Phone number patient can be reached at: Home number on file [phone] (home)    Best Time: anytime    Call taken on 10/11/2018 at 10:18 AM by KATHRINE TELLO

## 2018-10-11 NOTE — TELEPHONE ENCOUNTER
A little confused.    Is patient needing Duoneb refill for her nebulizer machine - to be used every 4-6 hours as needed for shortness of breath OR    Symbicort (which includes budesonide) which is used as a twice daily, scheduled controlled medication in the setting of COPD (which she has)?    While budesonide nebs technically exist, I would not recommend their use in her case.

## 2018-10-12 RX ORDER — IPRATROPIUM BROMIDE AND ALBUTEROL SULFATE 2.5; .5 MG/3ML; MG/3ML
1 SOLUTION RESPIRATORY (INHALATION) EVERY 6 HOURS PRN
Qty: 90 VIAL | Refills: 3 | Status: SHIPPED | OUTPATIENT
Start: 2018-10-12 | End: 2018-11-08

## 2018-10-12 NOTE — TELEPHONE ENCOUNTER
Called son and gave him the message below and he looked up both and said that he thinks the duoneb is going to be cheaper for them so would like that one sent to [facility] pharmacy in [place] on file.

## 2018-10-22 ENCOUNTER — OFFICE VISIT (OUTPATIENT)
Dept: INTERNAL MEDICINE | Facility: CLINIC | Age: 73
End: 2018-10-22
Payer: COMMERCIAL

## 2018-10-22 VITALS
RESPIRATION RATE: 18 BRPM | OXYGEN SATURATION: 96 % | TEMPERATURE: 97.6 F | BODY MASS INDEX: 15.03 KG/M2 | DIASTOLIC BLOOD PRESSURE: 70 MMHG | SYSTOLIC BLOOD PRESSURE: 102 MMHG | WEIGHT: 87 LBS | HEART RATE: 95 BPM

## 2018-10-22 DIAGNOSIS — J44.9 CHRONIC OBSTRUCTIVE PULMONARY DISEASE, UNSPECIFIED COPD TYPE (H): Primary | ICD-10-CM

## 2018-10-22 PROCEDURE — 99213 OFFICE O/P EST LOW 20 MIN: CPT | Performed by: INTERNAL MEDICINE

## 2018-10-22 RX ORDER — GUAIFENESIN 200 MG/1
400 TABLET ORAL EVERY 4 HOURS PRN
Qty: 60 TABLET | Refills: 11 | Status: SHIPPED | OUTPATIENT
Start: 2018-10-22

## 2018-10-22 NOTE — PROGRESS NOTES
SUBJECTIVE:                                                      HPI: Zainab Colunga is a pleasant 73 year old female who presents for ER follow-up:    Patient has a history of severe COPD. She has historically not been able to afford controller medications and has been using her albuterol inhaler as needed for shortness of breath. She is only able to walk about 10 feet before feeling so short of breath she has to rest.    Patient presented to the ER 2 weeks ago with worsening shortness of breath. COPD exacerbation treated with oral steroids, course of doxycycline, and duo nebs as needed.    Patient reports that her breathing is back to baseline now. She uses her albuterol inhaler or DuoNebs once-twice daily. Complains of occasional thick phlegm.    No fevers or chills.    The medication, allergy, and problem lists have been reviewed and updated as appropriate.       OBJECTIVE:                                                      /70  Pulse 95  Temp 97.6  F (36.4  C) (Oral)  Resp 18  Wt 87 lb (39.5 kg)  SpO2 96%  BMI 15.03 kg/m2  Constitutional: well-appearing  Respiratory: normal respiratory effort; quiet and distant breath sounds, but no wheezing  Psych: normal judgment and insight; normal mood and affect; recent and remote memory intact      ASSESSMENT/PLAN:                                                      (J44.9) Chronic obstructive pulmonary disease, unspecified COPD type (H)  (primary encounter diagnosis)  Comment:    - breathing back to baseline (fine at rest, SOB with minimal exertion).   - unfortunately at high risk for recurrent COPD exacerbation because cannot afford controller meds.  Plan:    - continue to use albuterol inhaler or DuoNebs as needed for shortness of breath.   - guaifenesin as needed for cough or thick phlegm.   - referred to pulmonology for further evaluation - patient to schedule.    The instructions on the AVS were discussed and explained to the patient. Patient  expressed understanding of instructions.    (Chart documentation was completed, in part, with [email or web address] voice-recognition software. Even though reviewed, some grammatical, spelling, and word errors may remain.)    Yu Rao MD   [address] [address]  T: [phone], F: [phone]

## 2018-10-22 NOTE — PATIENT INSTRUCTIONS
May take nebulizers as needed.     ---    Guaifenesin every 4 hours for cough or thick phlegm.     ---    Referral to pulmonary medicine provided.

## 2018-10-22 NOTE — MR AVS SNAPSHOT
After Visit Summary   10/22/2018    Zainab Colunga    MRN: [id number]           Patient Information     Date Of Birth          1945        Visit Information        Provider Department      10/22/2018 11:00 AM Yu Rao MD [facility]        Today's Diagnoses     Chronic obstructive pulmonary disease, unspecified COPD type (H)    -  1      Care Instructions    May take nebulizers as needed.     ---    Guaifenesin every 4 hours for cough or thick phlegm.     ---    Referral to pulmonary medicine provided.           Follow-ups after your visit        Additional Services     PULMONARY MEDICINE REFERRAL       Your provider has referred you to: N: [facility]  [place] [phone]   [email or web address]    Please be aware that coverage of these services is subject to the terms and limitations of your health insurance plan.  Call member services at your health plan with any benefit or coverage questions.      Please bring the following with you to your appointment:    (1) Any X-Rays, CTs or MRIs which have been performed.  Contact the facility where they were done to arrange for  prior to your scheduled appointment.    (2) List of current medications   (3) This referral request   (4) Any documents/labs given to you for this referral                  Who to contact     If you have questions or need follow up information about today's clinic visit or your schedule please contact [facility] directly at [phone].  Normal or non-critical lab and imaging results will be communicated to you by MyChart, letter or phone within 4 business days after the clinic has received the results. If you do not hear from us within 7 days, please contact the clinic through MyChart or phone. If you have a critical or abnormal lab result, we will notify you by phone as soon as possible.  Submit refill requests through [employer] or call your pharmacy  and they will forward the refill request to us. Please allow 3 business days for your refill to be completed.          Additional Information About Your Visit        Care EveryWhere ID     This is your Care EveryWhere ID. This could be used by other organizations to access your [place] medical records  [id number]        Your Vitals Were     Pulse Temperature Respirations Pulse Oximetry BMI (Body Mass Index)       95 97.6  F (36.4  C) (Oral) 18 96% 15.03 kg/m2        Blood Pressure from Last 3 Encounters:   10/22/18 102/70   10/06/18 145/73   09/26/18 110/72    Weight from Last 3 Encounters:   10/22/18 87 lb (39.5 kg)   09/26/18 85 lb 9.6 oz (38.8 kg)   04/03/18 79 lb 9.6 oz (36.1 kg)              We Performed the Following     PULMONARY MEDICINE REFERRAL          Today's Medication Changes          These changes are accurate as of 10/22/18 11:16 AM.  If you have any questions, ask your nurse or doctor.               Start taking these medicines.        Dose/Directions    guaiFENesin 200 MG Tabs tablet   Commonly known as:  ORGANIDIN   Used for:  Chronic obstructive pulmonary disease, unspecified COPD type (H)   Started by:  Yu Rao MD        Dose:  400 mg   Take 2 tablets (400 mg) by mouth every 4 hours as needed for cough   Quantity:  60 tablet   Refills:  11            Where to get your medicines      These medications were sent to [facility] [address] [place] [address]  [id number] [place] [address]     Phone:  [phone]     guaiFENesin 200 MG Tabs tablet                Primary Care Provider Office Phone # Fax #    Yu Rao [phone]       [address] [facility] [address]        Equal Access to Services     [facility] ANA : Antoinette Santos, shazia bunch, qalaura kaaltrish lyles, rudolph persaud. So [facility] [phone].    ATENCIÓN: Si habla español, tiene a amezquita disposición servicios  clara de asistencia lingüística. Luis Fernando de la paz [phone].    We comply with applicable federal civil rights laws and [place] laws. We do not discriminate on the basis of race, color, national origin, age, disability, sex, sexual orientation, or gender identity.            Thank you!     Thank you for choosing [facility]  for your care. Our goal is always to provide you with excellent care. Hearing back from our patients is one way we can continue to improve our services. Please take a few minutes to complete the written survey that you may receive in the mail after your visit with us. Thank you!             Your Updated Medication List - Protect others around you: Learn how to safely use, store and throw away your medicines at www.disposemymeds.org.          This list is accurate as of 10/22/18 11:16 AM.  Always use your most recent med list.                   Brand Name Dispense Instructions for use Diagnosis    calcium carbonate 500 mg (elemental) 500 MG tablet    OS-GILMAR     Take 1 tablet by mouth 2 times daily        guaiFENesin 200 MG Tabs tablet    ORGANIDIN    60 tablet    Take 2 tablets (400 mg) by mouth every 4 hours as needed for cough    Chronic obstructive pulmonary disease, unspecified COPD type (H)       ipratropium - albuterol 0.5 mg/2.5 mg/3 mL 0.5-2.5 (3) MG/3ML neb solution    DUONEB    90 vial    Take 1 vial (3 mLs) by nebulization every 6 hours as needed for shortness of breath / dyspnea or wheezing    COPD exacerbation (H)       VENTOLIN  (90 Base) MCG/ACT inhaler   Generic drug:  albuterol     18 g    INHALE 2 PUFFS BY MOUTH EVERY 6 HOURS AS NEEDED FOR SHORTNESS OF BREATH/DYSPNEA OR WHEEZING    Chronic obstructive pulmonary disease, unspecified COPD type (H)

## 2018-11-08 DIAGNOSIS — J44.1 COPD EXACERBATION (H): ICD-10-CM

## 2018-11-08 RX ORDER — IPRATROPIUM BROMIDE AND ALBUTEROL SULFATE 2.5; .5 MG/3ML; MG/3ML
1 SOLUTION RESPIRATORY (INHALATION) EVERY 6 HOURS PRN
Qty: 90 VIAL | Refills: 3 | Status: SHIPPED | OUTPATIENT
Start: 2018-11-08

## 2018-11-08 NOTE — TELEPHONE ENCOUNTER
Requested Prescriptions   Pending Prescriptions Disp Refills     ipratropium - albuterol 0.5 mg/2.5 mg/3 mL (DUONEB) 0.5-2.5 (3) MG/3ML neb solution 90 vial 3     Sig: Take 1 vial (3 mLs) by nebulization every 6 hours as needed for shortness of breath / dyspnea or wheezing    There is no refill protocol information for this order        Last Written Prescription Date:  10/12/18  Last Fill Quantity: 90 vial,  # refills: 3   Last office visit: 10/22/2018 with prescribing provider:  10/22/18   Future Office Visit:      There is a change of pharmacy to [facility].

## 2018-11-08 NOTE — TELEPHONE ENCOUNTER
"Requested Prescriptions   Pending Prescriptions Disp Refills     ipratropium - albuterol 0.5 mg/2.5 mg/3 mL (DUONEB) 0.5-2.5 (3) MG/3ML neb solution 90 vial 3     Sig: Take 1 vial (3 mLs) by nebulization every 6 hours as needed for shortness of breath / dyspnea or wheezing    Short-Acting Beta Agonist Inhalers Protocol  Passed    11/8/2018 10:58 AM       Passed - Patient is age 12 or older       Passed - Recent (12 mo) or future (30 days) visit within the authorizing provider's specialty    Patient had office visit in the last 12 months or has a visit in the next 30 days with authorizing provider or within the authorizing provider's specialty.  See \"Patient Info\" tab in inbasket, or \"Choose Columns\" in Meds & Orders section of the refill encounter.              Rx transferred.    Prescription approved per [facility] Refill Protocol.    Barb CENTENO RN, BSN, PHN      "

## 2018-12-02 ENCOUNTER — HOSPITAL ENCOUNTER (EMERGENCY)
Facility: CLINIC | Age: 73
Discharge: HOME | End: 2018-12-02
Attending: EMERGENCY MEDICINE | Admitting: EMERGENCY MEDICINE
Payer: COMMERCIAL

## 2018-12-02 ENCOUNTER — APPOINTMENT (OUTPATIENT)
Dept: GENERAL RADIOLOGY | Facility: CLINIC | Age: 73
End: 2018-12-02
Attending: EMERGENCY MEDICINE
Payer: COMMERCIAL

## 2018-12-02 VITALS
OXYGEN SATURATION: 94 % | BODY MASS INDEX: 14.51 KG/M2 | SYSTOLIC BLOOD PRESSURE: 174 MMHG | DIASTOLIC BLOOD PRESSURE: 81 MMHG | TEMPERATURE: 97.7 F | WEIGHT: 85 LBS | HEIGHT: 64 IN

## 2018-12-02 DIAGNOSIS — R91.1 LEFT LOWER LOBE PULMONARY NODULE: ICD-10-CM

## 2018-12-02 DIAGNOSIS — J44.1 COPD EXACERBATION (H): ICD-10-CM

## 2018-12-02 PROCEDURE — 71046 X-RAY EXAM CHEST 2 VIEWS: CPT

## 2018-12-02 PROCEDURE — 99284 EMERGENCY DEPT VISIT MOD MDM: CPT | Mod: 25

## 2018-12-02 PROCEDURE — 25000125 ZZHC RX 250: Performed by: EMERGENCY MEDICINE

## 2018-12-02 PROCEDURE — 94640 AIRWAY INHALATION TREATMENT: CPT

## 2018-12-02 RX ORDER — PREDNISONE 20 MG/1
TABLET ORAL
Qty: 10 TABLET | Refills: 0 | Status: SHIPPED | OUTPATIENT
Start: 2018-12-02

## 2018-12-02 RX ORDER — PREDNISONE 20 MG/1
40 TABLET ORAL ONCE
Status: COMPLETED | OUTPATIENT
Start: 2018-12-02 | End: 2018-12-02

## 2018-12-02 RX ORDER — ALBUTEROL SULFATE 0.83 MG/ML
SOLUTION RESPIRATORY (INHALATION)
Status: DISCONTINUED
Start: 2018-12-02 | End: 2018-12-03 | Stop reason: HOSPADM

## 2018-12-02 RX ORDER — ALBUTEROL SULFATE 0.83 MG/ML
5 SOLUTION RESPIRATORY (INHALATION) ONCE
Status: COMPLETED | OUTPATIENT
Start: 2018-12-02 | End: 2018-12-02

## 2018-12-02 RX ORDER — ALBUTEROL SULFATE 5 MG/ML
2.5 SOLUTION RESPIRATORY (INHALATION) ONCE
Status: DISCONTINUED | OUTPATIENT
Start: 2018-12-02 | End: 2018-12-03 | Stop reason: HOSPADM

## 2018-12-02 RX ORDER — IPRATROPIUM BROMIDE AND ALBUTEROL SULFATE 2.5; .5 MG/3ML; MG/3ML
3 SOLUTION RESPIRATORY (INHALATION) ONCE
Status: COMPLETED | OUTPATIENT
Start: 2018-12-02 | End: 2018-12-02

## 2018-12-02 RX ADMIN — IPRATROPIUM BROMIDE AND ALBUTEROL SULFATE 3 ML: .5; 2.5 SOLUTION RESPIRATORY (INHALATION) at 21:39

## 2018-12-02 RX ADMIN — PREDNISONE 40 MG: 20 TABLET ORAL at 21:39

## 2018-12-02 RX ADMIN — ALBUTEROL SULFATE 2.5 MG: 2.5 SOLUTION RESPIRATORY (INHALATION) at 22:51

## 2018-12-02 ASSESSMENT — ENCOUNTER SYMPTOMS
NAUSEA: 0
SHORTNESS OF BREATH: 1
VOMITING: 0
DIARRHEA: 0
DIFFICULTY URINATING: 0
RHINORRHEA: 1

## 2018-12-02 NOTE — ED AVS SNAPSHOT
Emergency Department    [address] [facility] [address]    Phone:  [phone]    Fax:  [phone]                                       Zainab Colunga   MRN: [id number]    Department:   Emergency Department   Date of Visit:  12/2/2018           After Visit Summary Signature Page     I have received my discharge instructions, and my questions have been answered. I have discussed any challenges I see with this plan with the nurse or doctor.    ..........................................................................................................................................  Patient/Patient Representative Signature      ..........................................................................................................................................  Patient Representative Print Name and Relationship to Patient    ..................................................               ................................................  Date                                   Time    ..........................................................................................................................................  Reviewed by Signature/Title    ...................................................              ..............................................  Date                                               Time          22EPIC Rev 08/18

## 2018-12-02 NOTE — ED AVS SNAPSHOT
Emergency Department    [address] [facility] [address]    Phone:  [phone]    Fax:  [phone]                                       Zainab Colunga   MRN: [id number]    Department:   Emergency Department   Date of Visit:  12/2/2018           Patient Information     Date Of Birth          1945        Your diagnoses for this visit were:     COPD exacerbation (H)     Left lower lobe pulmonary nodule        You were seen by Matt Castro DO.      Follow-up Information     Follow up with Yu Rao MD In 2 days.    Specialty:  Internal Medicine    Contact information:    [address] [facility] [address]  [phone]          Follow up with  Emergency Department.    Specialty:  EMERGENCY MEDICINE    Why:  If symptoms worsen    Contact information:    [address] [facility] [address] [phone]        Discharge Instructions         COPD Flare    You have had a flare-up of your COPD.  COPD, or chronic obstructive pulmonary disease, is a common lung disease. It causes your airways to become irritated and narrower. This makes it harder for you to breathe. Emphysema and chronic bronchitis are both types of COPD. This is a chronic condition, which means you always have it. Sometimes it gets worse. When this happens, it is called a flare-up.  Symptoms of COPD  People with COPD may have symptoms most of the time. In a flare-up, your symptoms get worse. These symptoms may mean you are having a flare-up:    Shortness of breath, shallow or rapid breathing, or wheezing that gets worse    Lung infection    Cough that gets worse    More mucus, thicker mucus or mucus of a different color    Tiredness, decreased energy, or trouble doing your usual activities    Fever    Chest tightness    Your symptoms don t get better even when you use your usual medicines, inhalers, and nebulizer    Trouble talking    You feel confused  Causes of flare-ups  Unfortunately,  a flare-up can happen even though you did everything right, and you followed your doctor s instructions. Some causes of flare-ups are:    Smoking or secondhand smoke    Colds, the flu, or respiratory infections    Air pollution    Sudden change in the weather    Dust, irritating chemicals, or strong fumes    Not taking your medicines as prescribed  Home care  Here are some things you can do at home to treat a flare-up:    Try not to panic. This makes it harder to breathe, and keeps you from doing the right things.    Don t smoke or be around others who are smoking.    Try to drink more fluids than usual during a flare-up, unless your doctor has told you not to because of heart and kidney problems. More fluids can help loosen the mucus.    Use your inhalers and nebulizer, if you have one, as you have been told to.    If you were given antibiotics, take them until they are used up or your doctor tells you to stop. It s important to finish the antibiotics, even though you feel better. This will make sure the infection has cleared.    If you were given prednisone or another steroid, finish it even if you feel better.  Preventing a flare-up  Even though flare-ups happen, the best way to treat one is to prevent it before it starts. Here are some pointers:    Don t smoke or be around others who are smoking.    Take your medicines as you have been told.    Talk with your doctor about getting a flu shot every year. Also find out if you need a pneumonia shot.    If there is a weather advisory warning to stay indoors, try to stay inside when possible.    Try to eat healthy and get plenty of sleep.    Try to avoid things that usually set you off, like dust, chemical fumes, hairsprays, or strong perfumes.  Follow-up care  Follow up with your healthcare provider, or as advised.  If a culture was done, you will be told if your treatment needs to be changed. You can call as directed for the results.  If X-rays were done, you will  be notified of any new findings that may affect your care.  Call 911  Call 911 if any of these occur:    You have trouble breathing    You feel confused or it s difficult to wake you up    You faint or lose consciousness    You have a rapid heart rate    You have new pain in your chest, arm, shoulder, neck or upper back  When to seek medical advice  Call your healthcare provider right away if any of these occur:    Wheezing or shortness of breath gets worse    You need to use your inhalers more often than usual without relief    Fever of 100.4 F (38 C) or higher, or as directed by your healthcare provider    Coughing up lots of dark-colored or bloody mucus (sputum)    Chest pain with each breath    You do not start to get better within 24 hours    Swelling of your ankles gets worse    Dizziness or weakness  Date Last Reviewed: 9/1/2016 2000-2018 [employer]. [address] [place] [address]. All rights reserved. This information is not intended as a substitute for professional medical care. Always follow your healthcare professional's instructions.        Pulmonary Nodule  A pulmonary nodule is small area of abnormal tissue in the lung. It is usually found on an X-ray taken for other reasons. It is a single spot (lesion) up to about an inch in size, surrounded by normal lung tissue.  Most nodules are not cancerous (benign). However, a nodule could be an early stage of lung cancer. Or it may be a sign of cancer that has spread from another part of the body. When a nodule is found on a chest X-ray, further testing is needed to determine if it is benign or cancerous (malignant). To give your healthcare provider more information about the nodule, you may have one or more of these tests:    Comparison of a new X-ray to earlier X-rays    Chest CT scan    PET scan    Bronchoscopy (a procedure that allows the healthcare provider to see the air passages inside the lung)    Needle biopsy    Lung  surgery or minimally invasive lung surgery such as thoracoscopy, a procedure that lets the surgeon take a portion of lung tissue through small incisions between the ribs.  Test results    If your nodule is benign, continued follow-up over the next 2 years is usually advised.    If tests do not determine whether your nodule is benign or malignant, surgery may be advised.    If tests show that the nodule is definitely malignant, surgery will probably be advised. Often surgery will be recommended without a biopsy, if the other testing strongly suggests that the nodule is a cancer.  The best survival rates from lung cancer occur when the original tumor is small (less than 1 inch). Follow your healthcare provider's advice on the timing of further testing. Prompt treatment gives the best chance of curing lung cancer.  Prevention  Smoking remains one of the biggest risk factors for lung cancer. If you smoke, it is essential that you quit to lower your risk of lung cancer. Talk to your healthcare provider about things that can help you quit, including medicines and support groups. See the following websites for more information:    www.smokefree.gov    www.quitnet.com  Home care  Home care will depend on the diagnosis and the treatment used. Most people with a pulmonary nodule have no symptoms. If no special home care is required, you may return to your usual activities and diet.  Follow-up care  Follow up with your healthcare provider, or as advised.  More information about lung cancer is available from these resources:    American Lung Association: 374.123.7381, www.lung.org    National Cancer [place]: 221.740.6059, www.cancer.gov  When to seek medical advice  Call your healthcare provider right away if any of these occur:    Fever of 100.4 F (38 C) or higher, or as directed by your healthcare provider    Unintended weight change  Call 911  Call 911 if any of these occur:    Coughing up blood    Chest pain or shortness  of breath   Date Last Reviewed: 6/1/2018 2000-2018 [employer]. [address] [place] [address], [place] [address]. All rights reserved. This information is not intended as a substitute for professional medical care. Always follow your healthcare professional's instructions.          24 Hour Appointment Hotline       To make an appointment at any [facility], call [phone] ([phone]). If you don't have a family doctor or clinic, we will help you find one. [facility] are conveniently located to serve the needs of you and your family.             Review of your medicines      START taking        Dose / Directions Last dose taken    predniSONE 20 MG tablet   Commonly known as:  DELTASONE   Quantity:  10 tablet        Take two tablets (= 40mg) each day for 5 (five) days   Refills:  0          Our records show that you are taking the medicines listed below. If these are incorrect, please call your family doctor or clinic.        Dose / Directions Last dose taken    calcium carbonate 500 MG tablet   Commonly known as:  OS-GILMAR   Dose:  1 tablet        Take 1 tablet by mouth 2 times daily   Refills:  0        guaiFENesin 200 MG tablet   Commonly known as:  ORGANIDIN   Dose:  400 mg   Quantity:  60 tablet        Take 2 tablets (400 mg) by mouth every 4 hours as needed for cough   Refills:  11        ipratropium - albuterol 0.5 mg/2.5 mg/3 mL 0.5-2.5 (3) MG/3ML neb solution   Commonly known as:  DUONEB   Dose:  1 vial   Quantity:  90 vial        Take 1 vial (3 mLs) by nebulization every 6 hours as needed for shortness of breath / dyspnea or wheezing   Refills:  3        VENTOLIN  (90 Base) MCG/ACT inhaler   Quantity:  18 g   Generic drug:  albuterol        INHALE 2 PUFFS BY MOUTH EVERY 6 HOURS AS NEEDED FOR SHORTNESS OF BREATH/DYSPNEA OR WHEEZING   Refills:  3                Prescriptions were sent or printed at these locations (1 Prescription)                   [id number] -  [place], [place] - [address] [facility]   [address] [facility] [address]    Telephone:  [phone]   Fax:  [phone]   Hours:                  E-Prescribed (1 of 1)         predniSONE (DELTASONE) 20 MG tablet                Procedures and tests performed during your visit     XR Chest 2 Views      Orders Needing Specimen Collection     None      Pending Results     No orders found from 11/30/2018 to 12/3/2018.            Pending Culture Results     No orders found from 11/30/2018 to 12/3/2018.            Pending Results Instructions     If you had any lab results that were not finalized at the time of your Discharge, you can call the ED Lab Result RN at [phone]. You will be contacted by this team for any positive Lab results or changes in treatment. The nurses are available 7 days a week from 10A to 6:30P.  You can leave a message 24 hours per day and they will return your call.        Test Results From Your Hospital Stay        12/2/2018 10:03 PM      Narrative     CHEST TWO VIEWS  12/2/2018 9:56 PM     HISTORY: dyspnea, COPD;     COMPARISON: 10/6/2018 chest x-ray        Impression     IMPRESSION:   1. Extensive lung hyperinflation consistent with chronic obstructive  pulmonary disease redemonstrated.  2. No pulmonary vascular congestion or pleural effusion.  3. indeterminate 1 cm nodular opacity left lung base.  4. No acute appearing infiltrate.    MARI FRITZ MD                Clinical Quality Measure: Blood Pressure Screening     Your blood pressure was checked while you were in the emergency department today. The last reading we obtained was  BP: 174/81 . Please read the guidelines below about what these numbers mean and what you should do about them.  If your systolic blood pressure (the top number) is less than 120 and your diastolic blood pressure (the bottom number) is less than 80, then your blood pressure is normal. There is nothing more that you need to do about it.  If your  systolic blood pressure (the top number) is 120-139 or your diastolic blood pressure (the bottom number) is 80-89, your blood pressure may be higher than it should be. You should have your blood pressure rechecked within a year by a primary care provider.  If your systolic blood pressure (the top number) is 140 or greater or your diastolic blood pressure (the bottom number) is 90 or greater, you may have high blood pressure. High blood pressure is treatable, but if left untreated over time it can put you at risk for heart attack, stroke, or kidney failure. You should have your blood pressure rechecked by a primary care provider within the next 4 weeks.  If your provider in the emergency department today gave you specific instructions to follow-up with your doctor or provider even sooner than that, you should follow that instruction and not wait for up to 4 weeks for your follow-up visit.        Thank you for choosing [facility]       Thank you for choosing [facility] for your care. Our goal is always to provide you with excellent care. Hearing back from our patients is one way we can continue to improve our services. Please take a few minutes to complete the written survey that you may receive in the mail after you visit with us. Thank you!        Care EveryWhere ID     This is your Care EveryWhere ID. This could be used by other organizations to access your [facility] medical records  [id number]        Equal Access to Services     [facility] : Antoinette Santos, wapuneetda alivia, qaybta kaalmada trupti, rudolph persaud. So [facility] [phone].    ATENCIÓN: Si habla español, tiene a amezquita disposición servicios gratuitos de asistencia lingüística. Llame al [phone].    We comply with applicable federal civil rights laws and [place] laws. We do not discriminate on the basis of race, color, national origin, age, disability, sex, sexual orientation, or gender identity.             After Visit Summary       This is your record. Keep this with you and show to your community pharmacist(s) and doctor(s) at your next visit.

## 2018-12-03 NOTE — DISCHARGE INSTRUCTIONS
COPD Flare    You have had a flare-up of your COPD.  COPD, or chronic obstructive pulmonary disease, is a common lung disease. It causes your airways to become irritated and narrower. This makes it harder for you to breathe. Emphysema and chronic bronchitis are both types of COPD. This is a chronic condition, which means you always have it. Sometimes it gets worse. When this happens, it is called a flare-up.  Symptoms of COPD  People with COPD may have symptoms most of the time. In a flare-up, your symptoms get worse. These symptoms may mean you are having a flare-up:    Shortness of breath, shallow or rapid breathing, or wheezing that gets worse    Lung infection    Cough that gets worse    More mucus, thicker mucus or mucus of a different color    Tiredness, decreased energy, or trouble doing your usual activities    Fever    Chest tightness    Your symptoms don t get better even when you use your usual medicines, inhalers, and nebulizer    Trouble talking    You feel confused  Causes of flare-ups  Unfortunately, a flare-up can happen even though you did everything right, and you followed your doctor s instructions. Some causes of flare-ups are:    Smoking or secondhand smoke    Colds, the flu, or respiratory infections    Air pollution    Sudden change in the weather    Dust, irritating chemicals, or strong fumes    Not taking your medicines as prescribed  Home care  Here are some things you can do at home to treat a flare-up:    Try not to panic. This makes it harder to breathe, and keeps you from doing the right things.    Don t smoke or be around others who are smoking.    Try to drink more fluids than usual during a flare-up, unless your doctor has told you not to because of heart and kidney problems. More fluids can help loosen the mucus.    Use your inhalers and nebulizer, if you have one, as you have been told to.    If you were given antibiotics, take them until they are used up or your doctor tells you  to stop. It s important to finish the antibiotics, even though you feel better. This will make sure the infection has cleared.    If you were given prednisone or another steroid, finish it even if you feel better.  Preventing a flare-up  Even though flare-ups happen, the best way to treat one is to prevent it before it starts. Here are some pointers:    Don t smoke or be around others who are smoking.    Take your medicines as you have been told.    Talk with your doctor about getting a flu shot every year. Also find out if you need a pneumonia shot.    If there is a weather advisory warning to stay indoors, try to stay inside when possible.    Try to eat healthy and get plenty of sleep.    Try to avoid things that usually set you off, like dust, chemical fumes, hairsprays, or strong perfumes.  Follow-up care  Follow up with your healthcare provider, or as advised.  If a culture was done, you will be told if your treatment needs to be changed. You can call as directed for the results.  If X-rays were done, you will be notified of any new findings that may affect your care.  Call 911  Call 911 if any of these occur:    You have trouble breathing    You feel confused or it s difficult to wake you up    You faint or lose consciousness    You have a rapid heart rate    You have new pain in your chest, arm, shoulder, neck or upper back  When to seek medical advice  Call your healthcare provider right away if any of these occur:    Wheezing or shortness of breath gets worse    You need to use your inhalers more often than usual without relief    Fever of 100.4 F (38 C) or higher, or as directed by your healthcare provider    Coughing up lots of dark-colored or bloody mucus (sputum)    Chest pain with each breath    You do not start to get better within 24 hours    Swelling of your ankles gets worse    Dizziness or weakness  Date Last Reviewed: 9/1/2016 2000-2018 [employer]. [address] [facility],  [place] [address]. All rights reserved. This information is not intended as a substitute for professional medical care. Always follow your healthcare professional's instructions.        Pulmonary Nodule  A pulmonary nodule is small area of abnormal tissue in the lung. It is usually found on an X-ray taken for other reasons. It is a single spot (lesion) up to about an inch in size, surrounded by normal lung tissue.  Most nodules are not cancerous (benign). However, a nodule could be an early stage of lung cancer. Or it may be a sign of cancer that has spread from another part of the body. When a nodule is found on a chest X-ray, further testing is needed to determine if it is benign or cancerous (malignant). To give your healthcare provider more information about the nodule, you may have one or more of these tests:    Comparison of a new X-ray to earlier X-rays    Chest CT scan    PET scan    Bronchoscopy (a procedure that allows the healthcare provider to see the air passages inside the lung)    Needle biopsy    Lung surgery or minimally invasive lung surgery such as thoracoscopy, a procedure that lets the surgeon take a portion of lung tissue through small incisions between the ribs.  Test results    If your nodule is benign, continued follow-up over the next 2 years is usually advised.    If tests do not determine whether your nodule is benign or malignant, surgery may be advised.    If tests show that the nodule is definitely malignant, surgery will probably be advised. Often surgery will be recommended without a biopsy, if the other testing strongly suggests that the nodule is a cancer.  The best survival rates from lung cancer occur when the original tumor is small (less than 1 inch). Follow your healthcare provider's advice on the timing of further testing. Prompt treatment gives the best chance of curing lung cancer.  Prevention  Smoking remains one of the biggest risk factors for lung cancer. If you smoke,  it is essential that you quit to lower your risk of lung cancer. Talk to your healthcare provider about things that can help you quit, including medicines and support groups. See the following websites for more information:    www.smokefree.gov    www.quitnet.com  Home care  Home care will depend on the diagnosis and the treatment used. Most people with a pulmonary nodule have no symptoms. If no special home care is required, you may return to your usual activities and diet.  Follow-up care  Follow up with your healthcare provider, or as advised.  More information about lung cancer is available from these resources:    American Lung Association: 960.961.3409, www.lung.org    National Cancer [place]: 304.807.3018, www.cancer.gov  When to seek medical advice  Call your healthcare provider right away if any of these occur:    Fever of 100.4 F (38 C) or higher, or as directed by your healthcare provider    Unintended weight change  Call 911  Call 911 if any of these occur:    Coughing up blood    Chest pain or shortness of breath   Date Last Reviewed: 6/1/2018 2000-2018 [employer]. [address] [place] [address], [place] [address]. All rights reserved. This information is not intended as a substitute for professional medical care. Always follow your healthcare professional's instructions.

## 2018-12-03 NOTE — ED PROVIDER NOTES
"  History     Chief Complaint:  Shortness of breath     HPI   Zainab Colunga is a 73 year old female, with a history of COPD, who presents with shortness of breath. The patient reports that she has been experiencing shortness of breath for the past week, however today she has gotten much worse, prompting her arrival here to the ED today. She reports that she is unable to walk around and complete simple tasks without needing to sit down and catch her breath. The patient denies any chest pain, diarrhea, vomiting, nausea, or difficulty with urination.  The patient also reports that she has never been on diuretics.      CARDIAC RISK FACTORS:  Sex:    female  Tobacco:   Former smoker  Hypertension:   no  Hyperlipidemia:  no  Diabetes:   no  Family History:  Brother: Diabetes    PE/DVT RISK FACTORS:  Sex:    female  Hormones:   no  Tobacco:   Former smoker  Cancer:   no  Travel:   no  Surgery:   no  Other immobilization: no  Personal history:  no  Family history:  Mother: cancer    Allergies:  Bupropion  Ibuprofen  Penicillins     Medications:    Calcium carbonate  Organidin  Duoneb  Ventolin    Past Medical History:    COPD  Underweight   Dysplasia     Past Surgical History:    Cataract IOL  Fallopian tube repair  Crispin-vulvectomy    Family History:    Mother: Cancer  Brother: Type 2 Diabetes, lung cancer    Social History:  Smoking Status: Former Smoker, quit 9 months ago  Alcohol Use: No  Patient presents alone.  Marital Status:       Review of Systems   HENT: Positive for rhinorrhea.    Respiratory: Positive for shortness of breath.    Cardiovascular: Negative for chest pain and leg swelling.   Gastrointestinal: Negative for diarrhea, nausea and vomiting.   Genitourinary: Negative for difficulty urinating.   All other systems reviewed and are negative.    Physical Exam   First Vitals:  BP: 174/81  Heart Rate: 102  Temp: 97.7  F (36.5  C)  Height: 162.6 cm (5' 4\")  Weight: 38.6 kg (85 lb)  SpO2: 93 %    Patient " "Vitals for the past 24 hrs:   BP Temp Temp src Heart Rate SpO2 Height Weight   12/02/18 2210 - - - 96 94 % - -   12/02/18 2106 174/81 97.7  F (36.5  C) Oral 102 93 % 1.626 m (5' 4\") 38.6 kg (85 lb)       Physical Exam  Physical Exam   General:  Sitting on bed   HENT:  No obvious trauma to head  Right Ear:  External ear normal.   Left Ear:  External ear normal.   Nose:  Nose normal.   Eyes:  Conjunctivae and EOM are normal. Pupils are equal, round, and reactive.   Neck: Normal range of motion. Neck supple. No tracheal deviation present.   CV:  Normal heart sounds. No murmur heard.  Pulm/Chest: Scattered wheezes throughout with a prolonged expiratory phase. Moderate air exchange.  Abd: Soft. No distension. There is no tenderness. There is no rigidity, no rebound and no guarding.   M/S: Normal range of motion. No calf pain or swelling.  Neuro: Alert. GCS 15.  Skin: Skin is warm and dry. No rash noted. Not diaphoretic.   Psych: Normal mood and affect. Behavior is normal.       Emergency Department Course   Imaging:  Radiographic findings were communicated with the patient who voiced understanding of the findings.  XR Chest 2 Views  1. Extensive lung hyperinflation consistent with chronic obstructive  pulmonary disease redemonstrated.  2. No pulmonary vascular congestion or pleural effusion.  3. indeterminate 1 cm nodular opacity left lung base.  4. No acute appearing infiltrate.  As read by Radiology.    Interventions:  2139 Deltasone 40 mg oral  2139 Duoneb 3 mL nebulization  2210 Albuterol 2.5 mg nebulizer    Emergency Department Course:  Past medical records, nursing notes, and vitals reviewed.  2116: I performed an exam of the patient and obtained history, as documented above.    Patient ambulated and had a pulse oximetry drop to 88% off oxygen at the very lowest, but she was able to talk through this and was not in much distress.    The patient was sent for a xray while in the emergency department, findings " above.    2206: I rechecked the patient. Explained findings to patient. Reexamined lungs and revealed better air exchange. Patient desires to go home and not be admitted for observation.     I rechecked the patient. Findings and plan explained to the Patient. Patient discharged home with instructions regarding supportive care, medications, and reasons to return. The importance of close follow-up was reviewed.     Impression & Plan    Medical Decision Making:  Zainab Colunga is a very pleasant 73 year old female with a history of COPD who presents for evaluation of shortness of breath.   Signs and symptoms are consistent with COPD exacerbation.  A broad differential was considered including foreign body, COPD, viral induced reactive airway disease, pneumothorax, cardiac equivalent, allergic phenomena, pneumonia, bronchitis, etc.  Patient feels improved after interventions here in ED.   No indication for hospitalization at this time including no hypoxia, no marked increase in respiratory rate, minimal to no retractions.  The patient's initial resting pulse oximetry was normal.  With ambulation her pulse oximetry dropped to 88% at the very lowest.  She was able to talk despite this.  The patient does not qualify for inpatient admission and did not want to be admitted for observation.  She has a nebulizer at home and feels safe and comfortable going home.  She lives with her daughter.  Supportive outpatient management is indicated, medications for discharge noted above.  Close followup with primary care physician.  Return if increased wheezing, progressive shortness of breath, develops fever greater than 102.       There are no signs at this point of any other serious etiologies including those mentioned above, especially acute coronary syndrome. I doubt this is ACS given the classic story of COPD exacerbation given by the patient, the marked wheezing without rales.  The patient has no history of pulmonary embolism.   She has known COPD and has a clinical exam consistent with that.  She is not on diuretics nor does she have any significant lower extremity edema to suggest that the component of this could be CHF.  The patient's chest x-ray also does not show any evidence of congestion.  No signs of pneumonia by CXR.  She reports that she has enough albuterol for her nebulizer.  I will place her on prednisone for 5 days. Given no increased sputum production, no antibiotics indicated at this time.  Of note, I reviewed the incidental finding of the pulmonary nodule and recommended repeat film through her primary care doctor.  The patient is aware this could be early cancer.    The treatment plan was discussed with the patient and they expressed understanding of this plan and consented to the plan.  In addition, the patient will return to the emergency department if their symptoms persist, worsen, if new symptoms arise or if there is any concern as other pathology may be present that is not evident at this time. They also understand the importance of close follow up in the clinic and if unable to do so will return to the emergency department for a reevaluation. All questions were answered.    Diagnosis:    ICD-10-CM    1. COPD exacerbation (H) J44.1    2. Left lower lobe pulmonary nodule R91.1        Disposition:  Discharged to home    Discharge Medications:  New Prescriptions    PREDNISONE (DELTASONE) 20 MG TABLET    Take two tablets (= 40mg) each day for 5 (five) days       Arelis Murry  12/2/2018    EMERGENCY DEPARTMENT  Arelis SANCHES, am serving as a scribe at 9:16 PM on 12/2/2018 to document services personally performed by Matt Castro DO, based on my observations and the provider's statements to me.             Matt Castro DO  12/02/18 [id number]

## 2020-10-07 DIAGNOSIS — Z71.89 COUNSELING AND COORDINATION OF CARE: Primary | ICD-10-CM
